# Patient Record
Sex: MALE | Race: WHITE | Employment: FULL TIME | URBAN - METROPOLITAN AREA
[De-identification: names, ages, dates, MRNs, and addresses within clinical notes are randomized per-mention and may not be internally consistent; named-entity substitution may affect disease eponyms.]

---

## 2022-01-04 ENCOUNTER — OFFICE VISIT (OUTPATIENT)
Dept: ORTHOPEDIC SURGERY | Age: 57
End: 2022-01-04
Payer: COMMERCIAL

## 2022-01-04 VITALS — RESPIRATION RATE: 12 BRPM | BODY MASS INDEX: 36.44 KG/M2 | HEIGHT: 72 IN | WEIGHT: 269 LBS

## 2022-01-04 DIAGNOSIS — M23.92 INTERNAL DERANGEMENT OF KNEE JOINT, LEFT: ICD-10-CM

## 2022-01-04 DIAGNOSIS — M25.562 ACUTE PAIN OF LEFT KNEE: Primary | ICD-10-CM

## 2022-01-04 DIAGNOSIS — M25.462 EFFUSION OF LEFT KNEE: ICD-10-CM

## 2022-01-04 RX ORDER — ATORVASTATIN CALCIUM 10 MG/1
TABLET, FILM COATED ORAL
COMMUNITY
Start: 2021-10-20

## 2022-01-04 RX ORDER — ASPIRIN 81 MG/1
81 TABLET ORAL DAILY
COMMUNITY

## 2022-01-04 RX ORDER — METFORMIN HYDROCHLORIDE 500 MG/1
TABLET, EXTENDED RELEASE ORAL
COMMUNITY
Start: 2021-10-04

## 2022-01-04 RX ORDER — LIDOCAINE HYDROCHLORIDE 10 MG/ML
3 INJECTION, SOLUTION INFILTRATION; PERINEURAL ONCE
Status: COMPLETED | OUTPATIENT
Start: 2022-01-04 | End: 2022-01-04

## 2022-01-04 RX ADMIN — LIDOCAINE HYDROCHLORIDE 3 ML: 10 INJECTION, SOLUTION INFILTRATION; PERINEURAL at 10:41

## 2022-01-04 NOTE — PROGRESS NOTES
Delores Arroyo today for evaluation of significant left knee pain and swelling. He initially injured his knee in the summer 2020 jumping into a pool. His pain eventually resolved and he began to feel somewhat better with day-to-day activities. About 3 months he started playing pickle ball. About 6 weeks ago he started having pain is become much worse over the last 2 weeks to 3 weeks. Pain is 7 out of 10. He has had significant swelling. He has pain with walking, running, stairs, squatting, bending, and standing. His past history significant for type 2 diabetes and high cholesterol. He denies prior knee problems. He works in sales. History: Patient's relevant past family, medical, and social history are reviewed as part of today's visit. ROS of pertinent positives and negatives as above; otherwise negative. General Exam:    Vitals: Resp. rate 12, height 6' (1.829 m), weight 269 lb (122 kg). Constitutional: Patient is adequately groomed with no evidence of malnutrition  Mental Status: The patient is oriented to time, place and person. The patient's mood and affect are appropriate. Gait:  Patient walks with antalgic gait   lymphatic: The lymphatic examination bilaterally reveals all areas to be without enlargement or induration. Vascular: Examination reveals no swelling or calf tenderness. Peripheral pulses are palpable and 2+. Neurological: The patient has good coordination. There is no weakness or sensory deficit. Skin:    Head/Neck: inspection reveals no rashes, ulcerations or lesions. Trunk:  inspection reveals no rashes, ulcerations or lesions. Right Lower Extremity: inspection reveals no rashes, ulcerations or lesions. Left Lower Extremity: inspection reveals no rashes, ulcerations or lesions. Examination of the bilateral hips reveals normal flexion and extension. There is no restriction in rotation.   There is no tenderness to palpation anteriorly posteriorly or laterally. Right knee examination demonstrates no effusion. There is no tenderness to palpation over the medial or lateral joint line. There is no discomfort over the patellar tendon. There is no palpable popliteal cyst.  Sensation is intact. Range of motion is normal.  There is no patellofemoral crepitation. There is no instability to varus or  valgus stress applied at 0, 30, 60, or 90° of flexion. There is no anterior or posterior drawer. Lachman examination is normal.      Left knee has a very large effusion. He has no warmth or erythema. Range of motion 0 to 90 degrees. Calf is soft. Lachman is equivocal because of swelling and guarding. He has severe medial joint line pain and pain with Mariah's maneuver but no lateral joint line pain. There is no gross collateral ligament instability. X-rays were obtained today in the office and interpreted by me. AP standing, PA flexed, and merchant views of the bilateral knees as well as a lateral of the left knee. These demonstrate:  Mild medial compartment narrowing bilaterally and mild patellofemoral narrowing bilaterally    Most recent hemoglobin A1c was 6.0    Assessment: His profound left knee swelling disproportionate to the degenerative change seen on x-ray. Concern for internal derangement including medial meniscus tear and possibly ACL tear. Plan: MRI scan left knee after aspiration. Procedure: Under sterile technique, the left knee was anesthetized in the suprapatellar pouch. I then aspirated the left knee of approximately 80 cc of serous fluid. It decompressed nicely.

## 2022-01-10 ENCOUNTER — OFFICE VISIT (OUTPATIENT)
Dept: ORTHOPEDIC SURGERY | Age: 57
End: 2022-01-10
Payer: COMMERCIAL

## 2022-01-10 VITALS — BODY MASS INDEX: 36.44 KG/M2 | HEIGHT: 72 IN | WEIGHT: 269 LBS

## 2022-01-10 DIAGNOSIS — S83.242A ACUTE MEDIAL MENISCUS TEAR OF LEFT KNEE, INITIAL ENCOUNTER: ICD-10-CM

## 2022-01-10 DIAGNOSIS — M25.562 ACUTE PAIN OF LEFT KNEE: Primary | ICD-10-CM

## 2022-01-10 DIAGNOSIS — M25.462 EFFUSION OF LEFT KNEE: ICD-10-CM

## 2022-01-10 DIAGNOSIS — M17.12 ARTHRITIS OF LEFT KNEE: ICD-10-CM

## 2022-01-10 PROCEDURE — 20610 DRAIN/INJ JOINT/BURSA W/O US: CPT | Performed by: ORTHOPAEDIC SURGERY

## 2022-01-10 RX ORDER — METHYLPREDNISOLONE ACETATE 40 MG/ML
80 INJECTION, SUSPENSION INTRA-ARTICULAR; INTRALESIONAL; INTRAMUSCULAR; SOFT TISSUE ONCE
Status: COMPLETED | OUTPATIENT
Start: 2022-01-10 | End: 2022-01-10

## 2022-01-10 RX ORDER — LIDOCAINE HYDROCHLORIDE 10 MG/ML
3 INJECTION, SOLUTION INFILTRATION; PERINEURAL ONCE
Status: COMPLETED | OUTPATIENT
Start: 2022-01-10 | End: 2022-01-10

## 2022-01-10 RX ADMIN — LIDOCAINE HYDROCHLORIDE 3 ML: 10 INJECTION, SOLUTION INFILTRATION; PERINEURAL at 14:10

## 2022-01-10 RX ADMIN — METHYLPREDNISOLONE ACETATE 80 MG: 40 INJECTION, SUSPENSION INTRA-ARTICULAR; INTRALESIONAL; INTRAMUSCULAR; SOFT TISSUE at 14:10

## 2022-01-10 NOTE — PROGRESS NOTES
Nemesio Maldonado returns today for his left knee. The swelling recurred after just a couple of days. Pain is about 6 out of 10 today throughout the left knee. We obtained his MRI. General Exam:    Vitals: Height 6' (1.829 m), weight 269 lb (122 kg). Constitutional: Patient is adequately groomed with no evidence of malnutrition  Mental Status: The patient is oriented to time, place and person. The patient's mood and affect are appropriate. Left knee has a large effusion and tenderness over the medial joint line. Range of motion 0 to 115 degrees. He has no warmth erythema. Calf is soft. MRI left knee is reviewed. It demonstrates      FINDINGS:  Intact extensor mechanism.  Slight lateral patellar tilt.  Intermediate grade    chondromalacia medial trochlea.       Undersurface cleavage flap tear posterior horn and body medial meniscus.  Medial capsular    swelling.  MCL intact.       Lateral meniscus anterior horn root frayed.  LCL intact.       ACL and PCL are intact.       Femorotibial osteoarthropathy.  Intermediate grade chondromalacia posteromedial femoral    condyle.       Capsulosynovitis.  Debris and small loose bodies.       CONCLUSION:   1. Thin undersurface cleavage flap tear posterior horn and body medial meniscus. 2. Patellofemoral and medial compartment arthropathy. Intermediate grade chondromalacia. 3. Capsulosynovitis. Debris and loose bodies within the capsule and popliteus reflection.         Reviewed these findings and the report and the images with the patient. Left knee arthritis and medial meniscus tear. Plan: He is not really having mechanical symptoms but he is very swollen. We will attempt an injection today after aspiration and a round of therapy. If he does not have success we will need to consider arthroscopy. He agrees. Procedure: Under sterile technique, the left knee was anesthetized in the suprapatellar pouch.   I then aspirated the left knee of approximately 70 cc of serous fluid. Left knee was then injected with 80 mg of Depo-Medrol. He tolerated that well. Follow-up with me in a month.

## 2022-01-12 ENCOUNTER — HOSPITAL ENCOUNTER (OUTPATIENT)
Dept: PHYSICAL THERAPY | Age: 57
Setting detail: THERAPIES SERIES
Discharge: HOME | End: 2022-01-12
Payer: COMMERCIAL

## 2022-01-12 ENCOUNTER — TELEPHONE (OUTPATIENT)
Dept: PHYSICAL THERAPY | Age: 57
End: 2022-01-12

## 2022-01-12 PROCEDURE — 97112 NEUROMUSCULAR REEDUCATION: CPT

## 2022-01-12 PROCEDURE — 97110 THERAPEUTIC EXERCISES: CPT

## 2022-01-12 PROCEDURE — 97161 PT EVAL LOW COMPLEX 20 MIN: CPT

## 2022-01-12 NOTE — PLAN OF CARE
[address], [address] [place], [address]  Phone: [phone]   Fax: [phone]                                                       Physical Therapy Certification    Dear Referring Practitioner: Dr. Samm Francois,    We had the pleasure of evaluating the following patient for physical therapy services at [address] [place] [address]. A summary of our findings can be found in the initial assessment below. This includes our plan of care. If you have any questions or concerns regarding these findings, please do not hesitate to contact me at the office phone number checked above. Thank you for the referral.       Physician Signature:_______________________________Date:__________________  By signing above (or electronic signature), therapists plan is approved by physician      Patient: Chersie Rogers   : 1965   MRN: [id number]  Referring Physician: Referring Practitioner: Dr. Samm Francois      Evaluation Date: 2022      Medical Diagnosis Information:  Diagnosis: M17.12 (ICD-10-CM) - Arthritis of left knee   Treatment Diagnosis: Decreased functional mobility, Increased pain, Decreased ROM, Decreased strength, Decreased high level IADLs                                         Insurance information: PT Insurance Information: [employer]     C-SSRS Triggered by Intake questionnaire (Past 2 wk assessment):   [x] No, Questionnaire did not trigger screening.   [] Yes, Patient intake triggered further evaluation      [] C-SSRS Screening completed  [] PCP notified via Plan of Care  [] Emergency services notified     Precautions/ Contra-indications: HTN, DM type 2, high cholesterol   Latex Allergy:  [x]NO      []YES  Preferred Language for Healthcare:   [x]English       []other:    SUBJECTIVE: Patient stated complaint: L knee pain. Patient states the main complaint was the stiffness from the swelling.  Patient states the pain and swelling are remarkably better since the injection. He continues to hold back from pickle ball participation and golf. Patient does report the knee feels stiff when sitting for over 30 minutes. Treatment to this point includes aspiration x2, MRI, injection    Hx from chart review: significant left knee pain and swelling. He initially injured his knee in the summer 2020 jumping into a pool. His pain eventually resolved and he began to feel somewhat better with day-to-day activities. About 3 months he started playing pickle ball. About 6 weeks ago he started having pain. He has had significant swelling. Relevant Medical History: Type 2 diabetes, high cholesterol, HTN, COVID + history   Functional Disability Index:  LEFS 43.75% limitation 1/12/22    Pain Scale: 0-.5/10  Pain at worst: 7/10 (prior to injection)  Pain at best: 0/10    Easing factors: ice and Advil prior to injection   Provocative factors: (prior to injection) walking, running, stairs, squatting, bending, and standing. 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      OBJECTIVE:      Flexibility L* R Comment   Hamstring Mod limitation  Mod limitation     Gastroc      ITB      Quad Mod limitation  No limitation     Hip flexor        ROM PROM AROM Overpressure Comment    L R L* R L R    Flexion   118 tightness medial and lateranl joint line 135      Extension   0 0                              Strength L* R Comment   Quad 5 5    Hamstring 4+/5 5    Gastroc      Hip  flexion      Hip abd 4/5 5/5                    Special Test Results/Comment   Meniscal Click    Crepitus    Flexion Test    Valgus Laxity    Varus Laxity    Lachmans    Drop Back    Homans            Girth L R   Mid Patella     Suprapatellar     5cm above     15cm above       Reflexes/Sensation:    [x]Dermatomes/Myotomes intact    [x]Reflexes equal and normal bilaterally   []Other:    Joint mobility:     [x]Normal    []Hypo   []Hyper    Palpation: (+) minimal TTP medial joint line of left knee    Functional Mobility/Transfers: WNL    Posture: WNL    Bandages/Dressings/Incisions: n/a    Gait: (include devices/WB status) WNL                         [x] Patient history, allergies, meds reviewed. Medical chart reviewed. See intake form. Review Of Systems (ROS):  [x]Performed Review of systems (Integumentary, CardioPulmonary, Neurological) by intake and observation. Intake form has been scanned into medical record. Patient has been instructed to contact their primary care physician regarding ROS issues if not already being addressed at this time.       Co-morbidities/Complexities (which will affect course of rehabilitation):   []None           Arthritic conditions   []Rheumatoid arthritis (M05.9)  [x]Osteoarthritis (M19.91)   Cardiovascular conditions   [x]Hypertension (I10)  [x]Hyperlipidemia (E78.5)  []Angina pectoris (I20)  []Atherosclerosis (I70)   Musculoskeletal conditions   []Disc pathology   []Congenital spine pathologies   []Prior surgical intervention  []Osteoporosis (M81.8)  []Osteopenia (M85.8)   Endocrine conditions   []Hypothyroid (E03.9)  []Hyperthyroid Gastrointestinal conditions   []Constipation (E44.51)   Metabolic conditions   []Morbid obesity (E66.01)  [x]Diabetes type 1(E10.65) or 2 (E11.65)   []Neuropathy (G60.9)     Pulmonary conditions   []Asthma (J45)  []Coughing   []COPD (J44.9)   Psychological Disorders  []Anxiety (F41.9)  []Depression (F32.9)   []Other:   []Other:          Barriers to/and or personal factors that will affect rehab potential:              []Age  []Sex              []Motivation/Lack of Motivation [x]Co-Morbidities              []Cognitive Function, education/learning barriers              []Environmental, home barriers              []profession/work barriers  []past PT/medical experience  []other:  Justification:     Falls Risk Assessment (30 days):   [x] Falls Risk assessed and no intervention required. [] Falls Risk assessed and Patient requires intervention due to being higher risk   TUG score (>12s at risk):     [] Falls education provided, including         ASSESSMENT:   Functional Impairments:     []Noted lumbar/proximal hip/LE hypomobility   [x]Decreased LE functional ROM   [x]Decreased core/proximal hip strength and neuromuscular control   [x]Decreased LE functional strength   []Reduced balance/proprioceptive control   []other:      Functional Activity Limitations (from functional questionnaire and intake)    [x]Reduced ability to tolerate prolonged functional positions   []Reduced ability or difficulty with changes of positions or transfers between positions   []Reduced ability to maintain good posture and demonstrate good body mechanics with sitting, bending, and lifting   []Reduced ability to sleep   [] Reduced ability or tolerance with driving and/or computer work   []Reduced ability to perform lifting, carrying tasks   [x]Reduced ability to squat   []Reduced ability to forward bend   [x]Reduced ability to ambulate prolonged functional periods/distances/surfaces   []Reduced ability to ascend/descend stairs   [x]Reduced ability to run, hop or jump   []other:     Participation Restrictions    []Reduced participation in self care activities   [x]Reduced participation in home management activities   [x]Reduced participation in work activities   [x]Reduced participation in social activities. []Reduced participation in sport activities. Classification :    []Signs/symptoms consistent with post-surgical status including decreased ROM, strength and function.    []Signs/symptoms restriction.      [] Progressing: [] Met: [] Not Met: [] Adjusted     Electronically signed by:  Desirae Rueda, PT , DPT

## 2022-01-12 NOTE — FLOWSHEET NOTE
[address] [facility],[address], [address] [facility], [address]  Phone: [phone]   Fax: [phone]    Physical Therapy Treatment Note/ Progress Report:           Date:  2022    Patient Name:  Triston Huizar    :  1965  MRN: [id number]  Restrictions/Precautions:    Medical/Treatment Diagnosis Information:  · Diagnosis: M17.12 (ICD-10-CM) - Arthritis of left knee  Treatment Diagnosis: Decreased functional mobility, Increased pain, Decreased ROM, Decreased strength, Decreased high level IADLs  Insurance/Certification information:  PT Insurance Information: [employer]  Physician Information:  Referring Practitioner: Dr. Malik Suarez  Has the plan of care been signed (Y/N):        []  Yes  [x]  No     Date of Patient follow up with Physician: 22      Is this a Progress Report:     []  Yes  [x]  No        If Yes:  Date Range for reporting period:  Beginnin22  Ending    Progress report will be due (10 Rx or 30 days whichever is less):        Recertification will be due (POC Duration  / 90 days whichever is less): see above         Visit # Insurance Allowable Auth Required   1 60 VPCY [x]  Yes []  No        Functional Scale:     OUTCOME MEASURE DATE DEFICIT   LEFS 22 IE 43.75% Deficit         Latex Allergy:  [x]NO      []YES  Preferred Language for Healthcare:   [x]English       []other:      Pain level:  0-.5/10     SUBJECTIVE:  See eval    OBJECTIVE: See eval   Observation:    Test measurements:       RESTRICTIONS/PRECAUTIONS: HTN, DM type 2, high cholesterol     Exercises/Interventions:       Exercise/Equipment Resistance/Repetitions Other comments   Stretching     Hamstring 10x10\" Supine with strap   Prone quad 10x10\"    Inclined Calf     Hip Flexion- Darrick stretch     ITB     Hamstring 3x20\" Seated EOT                       SLR     Supine     Abduction x15    Adduction     Prone     Munices  Long sitting        Isometrics     Quad sets Patellar Glides     Medial     Superior     Inferior          ROM     Standing chair lunge     Hang Weights     Seated EOT knee flex/ext     Supine SB knee flexion     Weight Shift     Ankle Pumps     Heel slides 10x10\"              CKC     Calf raises     Wall sits     Step ups     Lateral step ups     1 leg stand     Squatting     CC TKE     Balance     bridges 2x10    FR quad iso     SL KB pass     KB DL     Decline heel taps                    PRE     Extension  RANGE:   Flexion  RANGE:        Leg curl               Bike          Manual interventions                     Therapeutic Exercise and NMR EXR  [x] (28670) Provided verbal/tactile cueing for activities related to strengthening, flexibility, endurance, ROM for improvements in LE, proximal hip, and core control with self care, mobility, lifting, ambulation. [x] (60312) Provided verbal/tactile cueing for activities related to improving balance, coordination, kinesthetic sense, posture, motor skill, proprioception  to assist with LE, proximal hip, and core control in self care, mobility, lifting, ambulation and eccentric single leg control.      NMR and Therapeutic Activities:    [x] (56659 or 49257) Provided verbal/tactile cueing for activities related to improving balance, coordination, kinesthetic sense, posture, motor skill, proprioception and motor activation to allow for proper function of core, proximal hip and LE with self care and ADLs  [] (29824) Gait Re-education- Provided training and instruction to the patient for proper LE, core and proximal hip recruitment and positioning and eccentric body weight control with ambulation re-education including up and down stairs     Home Exercise Program:    [x] (92896) Reviewed/Progressed HEP activities related to strengthening, flexibility, endurance, ROM of core, proximal hip and LE for functional self-care, mobility, lifting and ambulation/stair navigation   [] (80869)Reviewed/Progressed HEP activities related to improving balance, coordination, kinesthetic sense, posture, motor skill, proprioception of core, proximal hip and LE for self care, mobility, lifting, and ambulation/stair navigation      Manual Treatments:  PROM / STM / Oscillations-Mobs:  G-I, II, III, IV (PA's, Inf., Post.)  [] (51044) Provided manual therapy to mobilize LE, proximal hip and/or LS spine soft tissue/joints for the purpose of modulating pain, promoting relaxation,  increasing ROM, reducing/eliminating soft tissue swelling/inflammation/restriction, improving soft tissue extensibility and allowing for proper ROM for normal function with self care, mobility, lifting and ambulation. Modalities:     [] GAME READY (VASO)- for significant edema, swelling, pain control. Charges:  Timed Code Treatment Minutes: 23   Total Treatment Minutes: 17    151-720  [x] EVAL (LOW) 98090 (typically 20 minutes face-to-face)  [] EVAL (MOD) 29398 (typically 30 minutes face-to-face)  [] EVAL (HIGH) 22586 (typically 45 minutes face-to-face)  [] RE-EVAL     [x] GA(60424) x   1  [] IONTO  [x] NMR (99575) x 1    [] VASO  [] Manual (19568) x     [] Other:  [] TA x      [] Mech Traction (56630)  [] ES(attended) (63557)      [] ES (un) (83892):       ASSESSMENT:  See eval    HEP instruction:   Access Code: [email or web address]  URL: [email or web address]  Date: 01/12/2022  Prepared by: Fco Pepper  (see scanned forms)    GOALS:  Patient stated goal: return to hobbies    [] Progressing: [] Met: [] Not Met: [] Adjusted    Therapist goals for Patient:   Short Term Goals: To be achieved in: 2 weeks  1. Independent in HEP and progression per patient tolerance, in order to prevent re-injury. [] Progressing: [] Met: [] Not Met: [] Adjusted   2. Patient will have a decrease in pain to facilitate improvement in movement, function, and ADLs as indicated by Functional Deficits. [] Progressing: [] Met: [] Not Met: [] Adjusted    Long Term Goals:  To be achieved in: 4 weeks  1. Disability index score of 21.8% or less for the LEFS to assist with reaching prior level of function. [] Progressing: [] Met: [] Not Met: [] Adjusted  2. Patient will demonstrate increased AROM to 135 degrees flexion to allow for proper joint functioning as indicated by patients Functional Deficits. [] Progressing: [] Met: [] Not Met: [] Adjusted  3. Patient will demonstrate an increase in knee strength to 5/5 in LE to allow for proper functional mobility as indicated by patients Functional Deficits. [] Progressing: [] Met: [] Not Met: [] Adjusted  4. Patient will return to functional activities without increased symptoms or restriction. [] Progressing: [] Met: [] Not Met: [] Adjusted  5. Patient will be able to tolerate a seated position without increased symptoms or restriction. [] Progressing: [] Met: [] Not Met: [] Adjusted      Overall Progression Towards Functional goals/ Treatment Progress Update:  [] Patient is progressing as expected towards functional goals listed. [] Progression is slowed due to complexities/Impairments listed. [] Progression has been slowed due to co-morbidities. [x] Plan just implemented, too soon to assess goals progression <30days   [] Goals require adjustment due to lack of progress  [] Patient is not progressing as expected and requires additional follow up with physician  [] Other    Prognosis for POC: [x] Good [] Fair  [] Poor      Patient requires continued skilled intervention: [x] Yes  [] No    Treatment/Activity Tolerance:  [x] Patient tolerated treatment well [] Patient limited by fatique  [] Patient limited by pain  [] Patient limited by other medical complications  [] Other:  Patient reported decrease knee stiffness with supine heel slides.  Patient challenged maintaining good form with SL abd SLR, requiring verbal and tactile cueing from the physical therapist. Patient with good understanding of POC and the importance of HEP compliance. Return to Play: (if applicable)   []  Stage 1: Intro to Strength   []  Stage 2: Return to Run and Strength   []  Stage 3: Return to Jump and Strength   []  Stage 4: Dynamic Strength and Agility   []  Stage 5: Sport Specific Training     []  Ready to Return to Play, Meets All Above Stages   []  Not Ready for Return to Sports   Comments:            Patient education: HEP, POC    PLAN: See eval  [] Continue per plan of care [] Alter current plan (see comments above)  [x] Plan of care initiated [] Hold pending MD visit [] Discharge      Electronically signed by:  Emmett Adair, PT , DPT          Note: If patient does not return for scheduled/ recommended follow up visits, this note will serve as a discharge from care along with most recent update on progress.

## 2022-01-13 ENCOUNTER — TELEPHONE (OUTPATIENT)
Dept: PHYSICAL THERAPY | Age: 57
End: 2022-01-13

## 2022-01-13 NOTE — TELEPHONE ENCOUNTER
Pt called to say his pain flared back up. Pt denied using ice the past 2 days. Pt educated to use ice and compression through the weekend and update therapist on Monday for additional scheduling.      Emmett Adair, PT , DPT

## 2022-01-25 ENCOUNTER — HOSPITAL ENCOUNTER (OUTPATIENT)
Dept: PHYSICAL THERAPY | Age: 57
Setting detail: THERAPIES SERIES
Discharge: HOME | End: 2022-01-25
Payer: COMMERCIAL

## 2022-01-25 PROCEDURE — 97110 THERAPEUTIC EXERCISES: CPT

## 2022-01-25 PROCEDURE — 97530 THERAPEUTIC ACTIVITIES: CPT

## 2022-01-25 PROCEDURE — 97112 NEUROMUSCULAR REEDUCATION: CPT

## 2022-01-25 NOTE — FLOWSHEET NOTE
[facility], [address] [place], [address]  Phone: [phone]   Fax: [phone]    Physical Therapy Treatment Note/ Progress Report:           Date:  2022    Patient Name:  Slick Rahman    :  1965  MRN: [id number]  Restrictions/Precautions:    Medical/Treatment Diagnosis Information:  · Diagnosis: M17.12 (ICD-10-CM) - Arthritis of left knee  Treatment Diagnosis: Decreased functional mobility, Increased pain, Decreased ROM, Decreased strength, Decreased high level IADLs  Insurance/Certification information:  PT Insurance Information: [employer]  Physician Information:  Referring Practitioner: Dr. Noemi Hanson  Has the plan of care been signed (Y/N):        []  Yes  [x]  No     Date of Patient follow up with Physician: 22      Is this a Progress Report:     []  Yes  [x]  No        If Yes:  Date Range for reporting period:  Beginnin22  Ending    Progress report will be due (10 Rx or 30 days whichever is less):        Recertification will be due (POC Duration  / 90 days whichever is less): see above         Visit # Insurance Allowable Auth Required   2 60 VPCY [x]  Yes []  No        Functional Scale:     OUTCOME MEASURE DATE DEFICIT   LEFS 22 IE 43.75% Deficit         Latex Allergy:  [x]NO      []YES  Preferred Language for Healthcare:   [x]English       []other:      Pain level:  1-2/10     SUBJECTIVE:  Patient states the pain seems to come and go, with seemingly random onset. Patient states the pain feels like it is just below and inside of the knee cap. The patient reports being non complaint with HEP but states he ices.      OBJECTIVE: See eval   Observation:    Test measurements:       RESTRICTIONS/PRECAUTIONS: HTN, DM type 2, high cholesterol     Exercises/Interventions:       Exercise/Equipment Resistance/Repetitions Other comments   Stretching     Hamstring 10x10\" Supine with strap   Prone quad 5x20\" Inclined Calf 5x20\"    Hip Flexion- Darrick stretch     ITB 5x20\"    Hamstring 5x20\" Seated EOT                       SLR     Supine 3x10    Abduction 3x10    Adduction     Prone     Munices  Long sitting        Isometrics     Quad sets          Patellar Glides     Medial     Superior     Inferior          ROM     Standing chair lunge     Hang Weights     Seated EOT knee flex/ext     Supine SB knee flexion     Weight Shift     Ankle Pumps     Heel slides               CKC     Calf raises 2x10    Wall sits     Step ups     Lateral step ups     1 leg stand     Squatting 2x10 chair taps (elevated with 2 airex pads)    CC TKE     Balance SL stance 5x20\"    bridges 3x10     FR quad iso     SL KB pass     KB DL     Decline heel taps     Lateral band walks                PRE     Extension  RANGE:   Flexion  RANGE:        Leg press               Bike 5'         Manual interventions                     Therapeutic Exercise and NMR EXR  [x] (87417) Provided verbal/tactile cueing for activities related to strengthening, flexibility, endurance, ROM for improvements in LE, proximal hip, and core control with self care, mobility, lifting, ambulation. [x] (67671) Provided verbal/tactile cueing for activities related to improving balance, coordination, kinesthetic sense, posture, motor skill, proprioception  to assist with LE, proximal hip, and core control in self care, mobility, lifting, ambulation and eccentric single leg control.      NMR and Therapeutic Activities:    [x] (02940 or 18064) Provided verbal/tactile cueing for activities related to improving balance, coordination, kinesthetic sense, posture, motor skill, proprioception and motor activation to allow for proper function of core, proximal hip and LE with self care and ADLs  [] (40023) Gait Re-education- Provided training and instruction to the patient for proper LE, core and proximal hip recruitment and positioning and eccentric body weight control with ambulation re-education including up and down stairs     Home Exercise Program:    [x] (84920) Reviewed/Progressed HEP activities related to strengthening, flexibility, endurance, ROM of core, proximal hip and LE for functional self-care, mobility, lifting and ambulation/stair navigation   [] (87595)Reviewed/Progressed HEP activities related to improving balance, coordination, kinesthetic sense, posture, motor skill, proprioception of core, proximal hip and LE for self care, mobility, lifting, and ambulation/stair navigation      Manual Treatments:  PROM / STM / Oscillations-Mobs:  G-I, II, III, IV (PA's, Inf., Post.)  [] (03282) Provided manual therapy to mobilize LE, proximal hip and/or LS spine soft tissue/joints for the purpose of modulating pain, promoting relaxation,  increasing ROM, reducing/eliminating soft tissue swelling/inflammation/restriction, improving soft tissue extensibility and allowing for proper ROM for normal function with self care, mobility, lifting and ambulation. Modalities:     [] GAME READY (VASO)- for significant edema, swelling, pain control. Charges:  Timed Code Treatment Minutes: 42   Total Treatment Minutes: 44    [phone]   [] EVAL (LOW) 09740 (typically 20 minutes face-to-face)  [] EVAL (MOD) 63422 (typically 30 minutes face-to-face)  [] EVAL (HIGH) 77251 (typically 45 minutes face-to-face)  [] RE-EVAL     [x] YA(59093) x   1  [] IONTO  [x] NMR (02405) x 1    [] VASO  [] Manual (17751) x     [] Other:  [x] TA x   1   [] Mech Traction (15868)  [] ES(attended) (21049)      [] ES (un) (09499):       ASSESSMENT:  See eval    HEP instruction:   Access Code: [email or web address]  URL: [email or web address]  Date: 01/12/2022  Prepared by: Scooby Medina  (see scanned forms)    GOALS:  Patient stated goal: return to hobbies    [] Progressing: [] Met: [] Not Met: [] Adjusted    Therapist goals for Patient:   Short Term Goals: To be achieved in: 2 weeks  1.  Independent in HEP and progression per patient tolerance, in order to prevent re-injury. [] Progressing: [] Met: [] Not Met: [] Adjusted   2. Patient will have a decrease in pain to facilitate improvement in movement, function, and ADLs as indicated by Functional Deficits. [] Progressing: [] Met: [] Not Met: [] Adjusted    Long Term Goals: To be achieved in: 4 weeks  1. Disability index score of 21.8% or less for the LEFS to assist with reaching prior level of function. [] Progressing: [] Met: [] Not Met: [] Adjusted  2. Patient will demonstrate increased AROM to 135 degrees flexion to allow for proper joint functioning as indicated by patients Functional Deficits. [] Progressing: [] Met: [] Not Met: [] Adjusted  3. Patient will demonstrate an increase in knee strength to 5/5 in LE to allow for proper functional mobility as indicated by patients Functional Deficits. [] Progressing: [] Met: [] Not Met: [] Adjusted  4. Patient will return to functional activities without increased symptoms or restriction. [] Progressing: [] Met: [] Not Met: [] Adjusted  5. Patient will be able to tolerate a seated position without increased symptoms or restriction. [] Progressing: [] Met: [] Not Met: [] Adjusted      Overall Progression Towards Functional goals/ Treatment Progress Update:  [] Patient is progressing as expected towards functional goals listed. [] Progression is slowed due to complexities/Impairments listed. [] Progression has been slowed due to co-morbidities.   [x] Plan just implemented, too soon to assess goals progression <30days   [] Goals require adjustment due to lack of progress  [] Patient is not progressing as expected and requires additional follow up with physician  [] Other    Prognosis for POC: [x] Good [] Fair  [] Poor      Patient requires continued skilled intervention: [x] Yes  [] No    Treatment/Activity Tolerance:  [x] Patient tolerated treatment well [] Patient limited by fatique  [] Patient limited by pain  [] Patient limited by other medical complications  [] Other:  Patient challenged with today's progressions due to strength and mobility deficits. Patient given updated HEP this visit and the importance of compliance was discussed. Patient would benefit from continued functional strengthening in order to return to pain free PLOF. Return to Play: (if applicable)   []  Stage 1: Intro to Strength   []  Stage 2: Return to Run and Strength   []  Stage 3: Return to Jump and Strength   []  Stage 4: Dynamic Strength and Agility   []  Stage 5: Sport Specific Training     []  Ready to Return to Play, Meets All Above Stages   []  Not Ready for Return to Sports   Comments:            Patient education: HEP, POC    PLAN: See eval  [] Continue per plan of care [] Alter current plan (see comments above)  [x] Plan of care initiated [] Hold pending MD visit [] Discharge      Electronically signed by:  Fco Pepper PT , DPT          Note: If patient does not return for scheduled/ recommended follow up visits, this note will serve as a discharge from care along with most recent update on progress.

## 2022-01-31 ENCOUNTER — HOSPITAL ENCOUNTER (OUTPATIENT)
Dept: PHYSICAL THERAPY | Age: 57
Setting detail: THERAPIES SERIES
Discharge: HOME | End: 2022-01-31
Payer: COMMERCIAL

## 2022-01-31 PROCEDURE — 97110 THERAPEUTIC EXERCISES: CPT

## 2022-01-31 PROCEDURE — 97112 NEUROMUSCULAR REEDUCATION: CPT

## 2022-01-31 PROCEDURE — 97530 THERAPEUTIC ACTIVITIES: CPT

## 2022-01-31 NOTE — FLOWSHEET NOTE
Supine 3x10    Abduction 3x10    Adduction     Prone     Munices  Long sitting        Isometrics     Quad sets          Patellar Glides     Medial     Superior     Inferior          ROM     Standing chair lunge     Hang Weights     Seated EOT knee flex/ext     Supine SB knee flexion     Weight Shift     Ankle Pumps     Heel slides               CKC        Wall sits     Step ups     Lateral step ups     1 leg stand     Squatting 2x10 chair taps (elevated with 1 airex pad)    CC TKE     Balance SL stance 5x20\"    bridges 3x10     FR quad iso     SL KB pass     KB DL     Decline heel taps     Lateral band walks                PRE     Extension  RANGE:   Flexion 25# 3x10 SL RANGE:        Leg press               Bike 5'         Manual interventions                     Therapeutic Exercise and NMR EXR  [x] (34388) Provided verbal/tactile cueing for activities related to strengthening, flexibility, endurance, ROM for improvements in LE, proximal hip, and core control with self care, mobility, lifting, ambulation. [x] (30539) Provided verbal/tactile cueing for activities related to improving balance, coordination, kinesthetic sense, posture, motor skill, proprioception  to assist with LE, proximal hip, and core control in self care, mobility, lifting, ambulation and eccentric single leg control.      NMR and Therapeutic Activities:    [x] (11623 or 09321) Provided verbal/tactile cueing for activities related to improving balance, coordination, kinesthetic sense, posture, motor skill, proprioception and motor activation to allow for proper function of core, proximal hip and LE with self care and ADLs  [] (56891) Gait Re-education- Provided training and instruction to the patient for proper LE, core and proximal hip recruitment and positioning and eccentric body weight control with ambulation re-education including up and down stairs     Home Exercise Program:    [x] (36089) Reviewed/Progressed HEP activities related to strengthening, flexibility, endurance, ROM of core, proximal hip and LE for functional self-care, mobility, lifting and ambulation/stair navigation   [] (45300)Reviewed/Progressed HEP activities related to improving balance, coordination, kinesthetic sense, posture, motor skill, proprioception of core, proximal hip and LE for self care, mobility, lifting, and ambulation/stair navigation      Manual Treatments:  PROM / STM / Oscillations-Mobs:  G-I, II, III, IV (PA's, Inf., Post.)  [] (75684) Provided manual therapy to mobilize LE, proximal hip and/or LS spine soft tissue/joints for the purpose of modulating pain, promoting relaxation,  increasing ROM, reducing/eliminating soft tissue swelling/inflammation/restriction, improving soft tissue extensibility and allowing for proper ROM for normal function with self care, mobility, lifting and ambulation. Modalities:     [] GAME READY (VASO)- for significant edema, swelling, pain control. Charges:  Timed Code Treatment Minutes: 41   Total Treatment Minutes: 94    953-894   [] EVAL (LOW) 05821 (typically 20 minutes face-to-face)  [] EVAL (MOD) 54679 (typically 30 minutes face-to-face)  [] EVAL (HIGH) 83519 (typically 45 minutes face-to-face)  [] RE-EVAL     [x] AT(90786) x   1  [] IONTO  [x] NMR (04384) x 1    [] VASO  [] Manual (83496) x     [] Other:  [x] TA x   1   [] Mech Traction (85708)  [] ES(attended) (08786)      [] ES (un) (26862):       ASSESSMENT:  See eval    HEP instruction:   Access Code: [email or web address]  URL: [email or web address]  Date: 01/12/2022  Prepared by: Dianna Mendoza  (see scanned forms)    GOALS:  Patient stated goal: return to hobbies    [] Progressing: [] Met: [] Not Met: [] Adjusted    Therapist goals for Patient:   Short Term Goals: To be achieved in: 2 weeks  1. Independent in HEP and progression per patient tolerance, in order to prevent re-injury. [] Progressing: [] Met: [] Not Met: [] Adjusted   2.  Patient will have a decrease in pain to facilitate improvement in movement, function, and ADLs as indicated by Functional Deficits. [] Progressing: [] Met: [] Not Met: [] Adjusted    Long Term Goals: To be achieved in: 4 weeks  1. Disability index score of 21.8% or less for the LEFS to assist with reaching prior level of function. [] Progressing: [] Met: [] Not Met: [] Adjusted  2. Patient will demonstrate increased AROM to 135 degrees flexion to allow for proper joint functioning as indicated by patients Functional Deficits. [] Progressing: [] Met: [] Not Met: [] Adjusted  3. Patient will demonstrate an increase in knee strength to 5/5 in LE to allow for proper functional mobility as indicated by patients Functional Deficits. [] Progressing: [] Met: [] Not Met: [] Adjusted  4. Patient will return to functional activities without increased symptoms or restriction. [] Progressing: [] Met: [] Not Met: [] Adjusted  5. Patient will be able to tolerate a seated position without increased symptoms or restriction. [] Progressing: [] Met: [] Not Met: [] Adjusted      Overall Progression Towards Functional goals/ Treatment Progress Update:  [] Patient is progressing as expected towards functional goals listed. [] Progression is slowed due to complexities/Impairments listed. [] Progression has been slowed due to co-morbidities.   [x] Plan just implemented, too soon to assess goals progression <30days   [] Goals require adjustment due to lack of progress  [] Patient is not progressing as expected and requires additional follow up with physician  [] Other    Prognosis for POC: [x] Good [] Fair  [] Poor      Patient requires continued skilled intervention: [x] Yes  [] No    Treatment/Activity Tolerance:  [x] Patient tolerated treatment well [] Patient limited by fatique  [] Patient limited by pain  [] Patient limited by other medical complications  [] Other: Patient challenged with SL balance; however, demonstrating improved righting reactions. Patient did report cramping of hamstring on knee flexion machine. Patient with no knee pain throughout program. Patient would benefit from continued functional strengthening in order to return to pain free PLOF. Patient education: HEP, POC    PLAN: See eval  [] Continue per plan of care [] Alter current plan (see comments above)  [x] Plan of care initiated [] Hold pending MD visit [] Discharge      Electronically signed by:  Jonathan Duffy, PT , DPT          Note: If patient does not return for scheduled/ recommended follow up visits, this note will serve as a discharge from care along with most recent update on progress.

## 2022-02-02 ENCOUNTER — HOSPITAL ENCOUNTER (OUTPATIENT)
Dept: PHYSICAL THERAPY | Age: 57
Setting detail: THERAPIES SERIES
Discharge: HOME | End: 2022-02-02
Payer: COMMERCIAL

## 2022-02-02 PROCEDURE — 97530 THERAPEUTIC ACTIVITIES: CPT

## 2022-02-02 PROCEDURE — 97110 THERAPEUTIC EXERCISES: CPT

## 2022-02-02 PROCEDURE — 97140 MANUAL THERAPY 1/> REGIONS: CPT

## 2022-02-02 PROCEDURE — 97112 NEUROMUSCULAR REEDUCATION: CPT

## 2022-02-02 NOTE — PLAN OF CARE
[address] [facility], [address] [place], [address]  Phone: [phone]   Fax: [phone]    Physical Therapy Re-Certification Plan of Abhilash Ahumada      Dear  Dr. Debi Barnes,    We had the pleasure of treating the following patient for physical therapy services at [address] [place] [address]. A summary of our findings can be found in the updated assessment below. This includes our plan of care. If you have any questions or concerns regarding these findings, please do not hesitate to contact me at the office phone number checked above. Thank you for the referral.     Physician Signature:________________________________Date:__________________  By signing above (or electronic signature), therapists plan is approved by physician    Date Range Of Visits: 1/12/22-2/2/22  Total Visits to Date: 4  Overall Response to Treatment:   []Patient is responding well to treatment and improvement is noted with regards  to goals   []Patient should continue to improve in reasonable time if they continue HEP   []Patient has plateaued and is no longer responding to skilled PT intervention    []Patient is getting worse and would benefit from return to referring MD   []Patient unable to adhere to initial POC   []Other: Patient has made improvements with mobility, range of motion, strength, and functional activity tolerance through this period of skilled physical therapy. The patient continues to demonstrate posterior chain strength deficits, as well as decreased single leg stability L > R. The patient has been continuously educated on the importance of HEP completion. Patient remaining limited with ambulation and standing tolerance as well as lifting items around his house. Patient does report he feels like he self limits with some activities in attempts to prevent symptoms.  Patient will be progressed through additional functional mobility and strengthening in order to return the patient to his PLOF. Physical Therapy Treatment Note/ Progress Report:         Date:  2022    Patient Name:  Felicia Barnes    :  1965  MRN: [id number]  Restrictions/Precautions:    Medical/Treatment Diagnosis Information:  · Diagnosis: M17.12 (ICD-10-CM) - Arthritis of left knee  Treatment Diagnosis: Decreased functional mobility, Increased pain, Decreased ROM, Decreased strength, Decreased high level IADLs  Insurance/Certification information:  PT Insurance Information: [employer]  Physician Information:  Referring Practitioner: Dr. Artemio Mays  Has the plan of care been signed (Y/N):        []  Yes  [x]  No     Date of Patient follow up with Physician: 22      Is this a Progress Report:     [x]  Yes  []  No        If Yes:  Date Range for reporting period:  Beginnin22  Endin22    Progress report will be due (10 Rx or 30 days whichever is less): 41       Recertification will be due (POC Duration  / 90 days whichever is less): see above         Visit # Insurance Allowable Auth Required   4 60 VPCY [x]  Yes []  No        Functional Scale:     OUTCOME MEASURE DATE DEFICIT   LEFS 22 41.25% Deficit    LEFS 22 IE 43.75% Deficit         Latex Allergy:  [x]NO      []YES  Preferred Language for Healthcare:   [x]English       []other:      Pain level:  0/10     SUBJECTIVE:  Patient denies pain or soreness since the last physical therapy visit. Patient does feel like he is limping, but states he does not know why, as it is not painful to walk.      OBJECTIVE:      22  Flexibility L* R Comment   Hamstring Mod limitation  Mod limitation      Gastroc         ITB         Quad Min limitation  No limitation      Hip flexor            ROM PROM AROM Overpressure Comment     L R L* R L R     Flexion     135 135         Extension     0 0                                                   Strength L* R Comment   Quad 5 5     Hamstring 4+/5 5     Gastroc         Hip flexion         Hip abd 4+/5 5/5                                RESTRICTIONS/PRECAUTIONS: HTN, DM type 2, high cholesterol     Exercises/Interventions:     Exercise/Equipment Resistance/Repetitions Other comments   Stretching     Hamstring 10x10\" Supine with strap   Prone quad 5x20\"    Inclined Calf 5x20\"    Hip Flexion- Darrick stretch     ITB 5x20\"    Hamstring 5x20\" Seated EOT                       SLR     Supine HEP   Abduction HEP   Adduction     Prone     Munices  Long sitting        Isometrics     Quad sets          Patellar Glides     Medial     Superior     Inferior          ROM     Standing chair lunge     Hang Weights     Seated EOT knee flex/ext     Supine SB knee flexion     Weight Shift     Ankle Pumps     Heel slides               CKC        Wall sits     Step ups Power step 2x10    Lateral step ups     1 leg stand     Squatting     CC TKE     Balance SL stance 5x20\"    Bridges  3x10 Green    FR quad iso     SL KB pass     KB DL     Decline heel taps     Lateral band walks  Green at knees 3 short laps              PRE     Extension  RANGE:   Flexion 40# 3x10 SL RANGE:        Leg press               Bike 5'         Manual interventions     Prone: hypervolt to L hamstring muscle belly, IT band 8'               Therapeutic Exercise and NMR EXR  [x] (70002) Provided verbal/tactile cueing for activities related to strengthening, flexibility, endurance, ROM for improvements in LE, proximal hip, and core control with self care, mobility, lifting, ambulation. [x] (89469) Provided verbal/tactile cueing for activities related to improving balance, coordination, kinesthetic sense, posture, motor skill, proprioception  to assist with LE, proximal hip, and core control in self care, mobility, lifting, ambulation and eccentric single leg control.      NMR and Therapeutic Activities:    [x] (48287 or 96215) Provided verbal/tactile cueing for activities related to improving balance, coordination, kinesthetic sense, posture, motor skill, proprioception and motor activation to allow for proper function of core, proximal hip and LE with self care and ADLs  [] (21525) Gait Re-education- Provided training and instruction to the patient for proper LE, core and proximal hip recruitment and positioning and eccentric body weight control with ambulation re-education including up and down stairs     Home Exercise Program:    [x] (87589) Reviewed/Progressed HEP activities related to strengthening, flexibility, endurance, ROM of core, proximal hip and LE for functional self-care, mobility, lifting and ambulation/stair navigation   [] (86653)Reviewed/Progressed HEP activities related to improving balance, coordination, kinesthetic sense, posture, motor skill, proprioception of core, proximal hip and LE for self care, mobility, lifting, and ambulation/stair navigation      Manual Treatments:  PROM / STM / Oscillations-Mobs:  G-I, II, III, IV (PA's, Inf., Post.)  [x] (30793) Provided manual therapy to mobilize LE, proximal hip and/or LS spine soft tissue/joints for the purpose of modulating pain, promoting relaxation,  increasing ROM, reducing/eliminating soft tissue swelling/inflammation/restriction, improving soft tissue extensibility and allowing for proper ROM for normal function with self care, mobility, lifting and ambulation. Modalities:     [] GAME READY (VASO)- for significant edema, swelling, pain control.      Charges:  Timed Code Treatment Minutes: 58   Total Treatment Minutes: 58   [phone]   [] EVAL (LOW) 79577 (typically 20 minutes face-to-face)  [] EVAL (MOD) 13730 (typically 30 minutes face-to-face)  [] EVAL (HIGH) 06586 (typically 45 minutes face-to-face)  [] RE-EVAL     [x] EN(65512) x   1  [] IONTO  [x] NMR (22140) x 1    [] VASO  [x] Manual (19139) x  1   [] Other:  [x] TA x   1   [] Mech Traction (28477)  [] ES(attended) (96834)      [] ES (un) (25934):       ASSESSMENT:  See eval    HEP instruction:   Access Code: [email or web address]  URL: [email or web address]  Date: 01/12/2022  Prepared by: Adrianna Garduno  (see scanned forms)    GOALS:  Patient stated goal: return to hobbies    [x] Progressing: [] Met: [] Not Met: [] Adjusted    Therapist goals for Patient:   Short Term Goals: To be achieved in: 2 weeks  1. Independent in HEP and progression per patient tolerance, in order to prevent re-injury. [x] Progressing: [] Met: [] Not Met: [] Adjusted   2. Patient will have a decrease in pain to facilitate improvement in movement, function, and ADLs as indicated by Functional Deficits. [] Progressing: [x] Met: [] Not Met: [] Adjusted    Long Term Goals: To be achieved in: 4 weeks  1. Disability index score of 21.8% or less for the LEFS to assist with reaching prior level of function. [x] Progressing: [] Met: [] Not Met: [] Adjusted  2. Patient will demonstrate increased AROM to 135 degrees flexion to allow for proper joint functioning as indicated by patients Functional Deficits. [] Progressing: [x] Met: [] Not Met: [] Adjusted  3. Patient will demonstrate an increase in knee strength to 5/5 in LE to allow for proper functional mobility as indicated by patients Functional Deficits. [x] Progressing: [] Met: [] Not Met: [] Adjusted  4. Patient will return to functional activities without increased symptoms or restriction. [x] Progressing: [] Met: [] Not Met: [] Adjusted  5. Patient will be able to tolerate a seated position without increased symptoms or restriction. [] Progressing: [x] Met: [] Not Met: [] Adjusted      Overall Progression Towards Functional goals/ Treatment Progress Update:  [x] Patient is progressing as expected towards functional goals listed. [] Progression is slowed due to complexities/Impairments listed. [] Progression has been slowed due to co-morbidities.   [] Plan just implemented, too soon to assess goals progression <30days   [] Goals require adjustment due to lack of progress  [] Patient is not progressing as expected and requires additional follow up with physician  [] Other     Prognosis for POC: [x] Good [] Fair  [] Poor      Patient requires continued skilled intervention: [x] Yes  [] No    Treatment/Activity Tolerance:  [x] Patient tolerated treatment well [] Patient limited by fatique  [] Patient limited by pain  [] Patient limited by other medical complications  [] Other:         Patient education: HEP, POC    PLAN: Continue 1-2 x week 3 weeks (2/2/22)  [] Continue per plan of care [] Alter current plan (see comments above)  [x] Plan of care initiated [] Hold pending MD visit [] Discharge      Electronically signed by:  Shaji Pan, PT , DPT          Note: If patient does not return for scheduled/ recommended follow up visits, this note will serve as a discharge from care along with most recent update on progress.

## 2022-02-08 ENCOUNTER — OFFICE VISIT (OUTPATIENT)
Dept: ORTHOPEDIC SURGERY | Age: 57
End: 2022-02-08
Payer: COMMERCIAL

## 2022-02-08 VITALS — BODY MASS INDEX: 36.44 KG/M2 | RESPIRATION RATE: 12 BRPM | HEIGHT: 72 IN | WEIGHT: 269 LBS

## 2022-02-08 DIAGNOSIS — M17.12 ARTHRITIS OF LEFT KNEE: ICD-10-CM

## 2022-02-08 DIAGNOSIS — M25.562 ACUTE PAIN OF LEFT KNEE: Primary | ICD-10-CM

## 2022-02-08 PROCEDURE — 99212 OFFICE O/P EST SF 10 MIN: CPT | Performed by: ORTHOPAEDIC SURGERY

## 2022-02-08 NOTE — PROGRESS NOTES
Scotty Martinez returns today to follow-up his left knee. He feels really good. He did slip on the ice this morning at a little \"tweak\" but overall still feels dramatically better. He notes that when he does his exercises at home he does well. General Exam:    Vitals: Resp. rate 12, height 6' (1.829 m), weight 269 lb (122 kg). Constitutional: Patient is adequately groomed with no evidence of malnutrition  Mental Status: The patient is oriented to time, place and person. The patient's mood and affect are appropriate. Left knee today has trace tenderness medially but no effusion. Is full motion and no instability or severe crepitation. Calf is soft. Assessment: Improving discomfort with conservative management for left knee arthritis and medial meniscus tear      Plan:  We will continue with his home exercise program and follow-up with me on an as-needed basis

## 2022-02-09 ENCOUNTER — APPOINTMENT (OUTPATIENT)
Dept: PHYSICAL THERAPY | Age: 57
End: 2022-02-09
Payer: COMMERCIAL

## 2022-05-15 ENCOUNTER — CLINICAL DOCUMENTATION (OUTPATIENT)
Dept: OTHER | Age: 57
End: 2022-05-15